# Patient Record
Sex: MALE | Race: WHITE | NOT HISPANIC OR LATINO | Employment: FULL TIME | URBAN - METROPOLITAN AREA
[De-identification: names, ages, dates, MRNs, and addresses within clinical notes are randomized per-mention and may not be internally consistent; named-entity substitution may affect disease eponyms.]

---

## 2020-02-21 ENCOUNTER — HOSPITAL ENCOUNTER (EMERGENCY)
Facility: OTHER | Age: 25
Discharge: HOME | End: 2020-02-21
Attending: EMERGENCY MEDICINE
Payer: COMMERCIAL

## 2020-02-21 VITALS
SYSTOLIC BLOOD PRESSURE: 109 MMHG | HEART RATE: 82 BPM | TEMPERATURE: 98 F | RESPIRATION RATE: 20 BRPM | OXYGEN SATURATION: 97 % | DIASTOLIC BLOOD PRESSURE: 78 MMHG

## 2020-02-21 DIAGNOSIS — S62.035A CLOSED NONDISPLACED FRACTURE OF PROXIMAL THIRD OF SCAPHOID BONE OF LEFT WRIST, INITIAL ENCOUNTER: Primary | ICD-10-CM

## 2020-02-21 DIAGNOSIS — S42.92XA FRACTURE, SHOULDER, LEFT, CLOSED, INITIAL ENCOUNTER: ICD-10-CM

## 2020-02-21 DIAGNOSIS — T14.90XA TRAUMA: ICD-10-CM

## 2020-02-21 DIAGNOSIS — S70.02XA CONTUSION OF LEFT HIP, INITIAL ENCOUNTER: ICD-10-CM

## 2020-02-21 PROCEDURE — 99284 EMERGENCY DEPT VISIT MOD MDM: CPT | Mod: 25

## 2020-02-21 PROCEDURE — 96374 THER/PROPH/DIAG INJ IV PUSH: CPT

## 2020-02-21 PROCEDURE — 25000003 PHARM REV CODE 250: Performed by: EMERGENCY MEDICINE

## 2020-02-21 PROCEDURE — 96372 THER/PROPH/DIAG INJ SC/IM: CPT | Mod: 59

## 2020-02-21 PROCEDURE — 29125 APPL SHORT ARM SPLINT STATIC: CPT

## 2020-02-21 PROCEDURE — 63600175 PHARM REV CODE 636 W HCPCS: Performed by: EMERGENCY MEDICINE

## 2020-02-21 PROCEDURE — 29130 APPL FINGER SPLINT STATIC: CPT | Mod: FA

## 2020-02-21 RX ORDER — OXYCODONE AND ACETAMINOPHEN 5; 325 MG/1; MG/1
1 TABLET ORAL
Status: COMPLETED | OUTPATIENT
Start: 2020-02-21 | End: 2020-02-21

## 2020-02-21 RX ORDER — KETOROLAC TROMETHAMINE 30 MG/ML
30 INJECTION, SOLUTION INTRAMUSCULAR; INTRAVENOUS
Status: COMPLETED | OUTPATIENT
Start: 2020-02-21 | End: 2020-02-21

## 2020-02-21 RX ORDER — IBUPROFEN 600 MG/1
600 TABLET ORAL EVERY 8 HOURS PRN
Qty: 9 TABLET | Refills: 0 | Status: SHIPPED | OUTPATIENT
Start: 2020-02-21

## 2020-02-21 RX ORDER — OXYCODONE AND ACETAMINOPHEN 5; 325 MG/1; MG/1
1 TABLET ORAL EVERY 6 HOURS PRN
Qty: 10 TABLET | Refills: 0 | Status: SHIPPED | OUTPATIENT
Start: 2020-02-21

## 2020-02-21 RX ADMIN — KETOROLAC TROMETHAMINE 30 MG: 30 INJECTION, SOLUTION INTRAMUSCULAR; INTRAVENOUS at 10:02

## 2020-02-21 RX ADMIN — OXYCODONE HYDROCHLORIDE AND ACETAMINOPHEN 1 TABLET: 5; 325 TABLET ORAL at 10:02
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
## 2025-06-09 NOTE — PROGRESS NOTES
Jace Ceja  1995        Subjective     Chief Complaint: Establish Care      History of Present Illness:  Mr. Jace Ceja is a 30 y.o. male who presents to clinic for establishing care/annual        History of Present Illness    CHIEF COMPLAINT:  Mr. Ceja presents today for chronic fatigue and concerns about high iron levels    HISTORY OF PRESENT ILLNESS:  He reports experiencing chronic fatigue over the past few weeks significant enough to impact his daily routine, causing him to miss several days at his usual daily gym routine. His diet has also been affected due to the fatigue, though he reports feeling better today. He experiences abdominal trembling, liver pain, and frequent urination occurring less than 30 minutes apart. He also notes mild shortness of breath with exertion, specifically when walking and climbing stairs.    MEDICAL HISTORY:  He has a history of elevated hemoglobin levels up to 18 on previous testing. He currently takes creatine HCL supplements regularly, denying excessive use.    MENTAL HEALTH HISTORY:  He has a history of depression with previous treatments including Lexapro, Wellbutrin, and Seroquel. He has undergone ketamine infusion therapy, completing four sessions in fall of previous year with most recent infusion 2 weeks ago. He has also received ECT treatment in the past and had a 5-week mental health rehabilitation stay in [place], [place] last year. He currently attends weekly counseling sessions. He reports a recent depressive episode triggered by viewing ex-girlfriend's social media.    SUBSTANCE USE HISTORY:  He has a history of alcohol abuse, previously consuming 8-10 beers nightly but reports 10 months of sobriety. He also reports past cocaine use for 2.5 years with almost one year of sobriety. He currently uses nicotine through vaping and expresses desire to quit.      ROS:  Constitutional: +fatigue  ENT: -difficulty swallowing  Respiratory: +exertional  dyspnea  Gastrointestinal: +abdominal pain  Genitourinary: -hematuria  Psychiatric: +depression, +suicidal ideation, +lack of interest  Endocrine: +excessive urination         PAST HISTORY:     Past Medical History:   Diagnosis Date    Depression     Iron overload        Past Surgical History:   Procedure Laterality Date    HERNIA REPAIR  10/6/2022    TONSILLECTOMY  2016       Family History   Problem Relation Name Age of Onset    Depression Mother Kirstin Landry     Cancer Father Russ Ceja     Alcohol abuse Maternal Grandfather Franklyn Ceja ()     Cancer Maternal Grandfather Franklyn Ceja ()     Alcohol abuse Maternal Grandmother Awilda Landry ()     Cancer Paternal Grandmother Laura Ceja     Depression Paternal Grandmother Laura Ceja     Mental illness Paternal Grandmother Laura Ceja     Alcohol abuse Maternal Uncle Kemal aLndry     Drug abuse Maternal Uncle Kemal Landry     Alcohol abuse Paternal Cousin Bishnu Hua ()     Depression Paternal Cousin Bishnu Hua ()     Drug abuse Paternal Cousin Bishnu Hua ()     Early death Paternal Cousin Bishnu Hua ()     Depression Maternal Aunt Ariel Castro     Mental illness Maternal Aunt Ariel Castro     Depression Brother Ervin Ceja        Social History     Socioeconomic History    Marital status: Single   Tobacco Use    Smoking status: Every Day     Types: Vaping with nicotine    Smokeless tobacco: Never   Substance and Sexual Activity    Alcohol use: Not Currently     Comment: Sober for 10 months    Drug use: Not Currently     Types: Cocaine, Ketamine, LSD, Marijuana, MDMA (Ecstacy), Psilocybin     Comment: Clean from all drugs for 10 months.  Ketamine infusions.    Sexual activity: Not Currently     Partners: Female     Birth control/protection: Coitus interruptus     Comment: Former partner was on birth control.     Social Drivers of Health  "    Financial Resource Strain: Medium Risk (6/10/2025)    Overall Financial Resource Strain (CARDIA)     Difficulty of Paying Living Expenses: Somewhat hard   Food Insecurity: No Food Insecurity (6/10/2025)    Hunger Vital Sign     Worried About Running Out of Food in the Last Year: Never true     Ran Out of Food in the Last Year: Never true   Transportation Needs: No Transportation Needs (6/10/2025)    PRAPARE - Transportation     Lack of Transportation (Medical): No     Lack of Transportation (Non-Medical): No   Physical Activity: Sufficiently Active (6/10/2025)    Exercise Vital Sign     Days of Exercise per Week: 7 days     Minutes of Exercise per Session: 100 min   Stress: Stress Concern Present (6/10/2025)    [employer] - Occupational Stress Questionnaire     Feeling of Stress : Rather much   Housing Stability: Low Risk  (6/10/2025)    Housing Stability Vital Sign     Unable to Pay for Housing in the Last Year: No     Number of Times Moved in the Last Year: 0     Homeless in the Last Year: No       MEDICATIONS & ALLERGIES:     Current Outpatient Medications on File Prior to Visit   Medication Sig    [DISCONTINUED] ibuprofen (ADVIL,MOTRIN) 600 MG tablet Take 1 tablet (600 mg total) by mouth every 8 (eight) hours as needed for Pain.    [DISCONTINUED] oxyCODONE-acetaminophen (PERCOCET) 5-325 mg per tablet Take 1 tablet by mouth every 6 (six) hours as needed for Pain.     No current facility-administered medications on file prior to visit.       Review of patient's allergies indicates:  No Known Allergies    OBJECTIVE:     Vital Signs:  Vitals:    06/10/25 1125   BP: 110/76   BP Location: Right arm   Patient Position: Sitting   Pulse: 64   Resp: 18   Temp: 97.9 °F (36.6 °C)   TempSrc: Temporal   SpO2: 98%   Weight: 77.7 kg (171 lb 4.8 oz)   Height: 5' 11" (1.803 m)       Body mass index is 23.89 kg/m².     Physical Exam:  Physical Exam  Vitals and nursing note reviewed. " "  Constitutional:       General: He is not in acute distress.     Appearance: He is not ill-appearing.   HENT:      Head: Normocephalic and atraumatic.      Mouth/Throat:      Mouth: Mucous membranes are moist.      Pharynx: Oropharynx is clear. No oropharyngeal exudate or posterior oropharyngeal erythema.   Eyes:      Extraocular Movements: Extraocular movements intact.      Conjunctiva/sclera: Conjunctivae normal.   Cardiovascular:      Rate and Rhythm: Normal rate and regular rhythm.   Pulmonary:      Effort: Pulmonary effort is normal. No respiratory distress.      Breath sounds: Normal breath sounds. No wheezing or rales.   Chest:      Chest wall: No tenderness.   Abdominal:      Palpations: Abdomen is soft.      Tenderness: There is no abdominal tenderness. There is no guarding.   Musculoskeletal:         General: Normal range of motion.      Cervical back: Normal range of motion and neck supple. No tenderness.      Right lower leg: No edema.      Left lower leg: No edema.   Lymphadenopathy:      Cervical: No cervical adenopathy.   Skin:     General: Skin is warm and dry.   Neurological:      Mental Status: He is alert and oriented to person, place, and time.   Psychiatric:         Attention and Perception: Attention normal.         Mood and Affect: Mood is depressed.         Speech: Speech normal.         Behavior: Behavior normal. Behavior is not aggressive or hyperactive. Behavior is cooperative.         Thought Content: Thought content includes suicidal ideation. Thought content does not include homicidal ideation. Thought content does not include homicidal or suicidal plan.            Laboratory  No results found for: "WBC", "HGB", "HCT", "MCV", "PLT"  No results found for: "GLU", "NA", "K", "CL", "CO2", "BUN", "CREATININE", "CALCIUM", "MG"  No results found for: "INR", "PROTIME"  No results found for: "HGBA1C"  No results for input(s): "POCTGLUCOSE" in the last 72 hours.      Health Maintenance         " "Date Due Completion Date    Hepatitis C Screening Never done ---    Lipid Panel Never done ---    HIV Screening Never done ---    TETANUS VACCINE Never done ---    Pneumococcal Vaccines (Age 0-49) (1 of 2 - PCV) Never done ---    COVID-19 Vaccine (3 - 2024-25 season) 09/01/2024 5/4/2021    Influenza Vaccine (Season Ended) 09/01/2025 10/28/2019    RSV Vaccine (Age 60+ and Pregnant patients) (1 - 1-dose 75+ series) 05/06/2072 ---            ASSESSMENT & PLAN:   30 y.o. male who was seen today in clinic for establishing care/annual    Annual physical exam  -     Hemoglobin A1C; Future; Expected date: 06/10/2025  -     CBC Without Differential; Future; Expected date: 06/10/2025  -     Comprehensive Metabolic Panel; Future; Expected date: 06/10/2025  -     TSH; Future; Expected date: 06/10/2025  -     Lipid Panel; Future; Expected date: 06/10/2025  -     Hepatitis C Antibody; Future; Expected date: 06/10/2025  -     HIV 1/2 Ag/Ab (4th Gen); Future; Expected date: 06/10/2025  -     Ferritin; Future; Expected date: 06/10/2025  -     Iron and TIBC; Future; Expected date: 06/10/2025  -     Urinalysis; Future    Erythrocytosis  -     Ferritin; Future; Expected date: 06/10/2025  -     Iron and TIBC; Future; Expected date: 06/10/2025    Moderate episode of recurrent major depressive disorder  -     Ambulatory referral/consult to Psychiatry; Future; Expected date: 06/17/2025    Nicotine use    Alcohol dependence in remission         1. Annual physical exam    2. Erythrocytosis    3. Moderate episode of recurrent major depressive disorder    4. Nicotine use    5. Alcohol dependence in remission        1/2.  Nonfasting labs ordered.  Prior labs with erythrocytosis noted.  Iron studies ordered.  Consider hematology referral.  3.  Recent worsening mood after "lapse of judgement" and looking at ex girlfriend's social media page.  Reports suicidal ideation however denies intent or plan.  Decrease in interest in hobbies.  No HI.  Hx of " ketamine infusions/ECTs in past.  Has attempted Seroquel, Lexapro, and wellbutrin and reports side effects.  Not interested in restarting meds with these concerns.  Follows with therapist regularly.  Psych referral ordered.  Strict RTC/ED precautions given.  4.  Discussed importance of cessation.  Cessation program referral offered, deferred today and will consider.  5.  Stable, encouraged patient to remain abstinent from alcohol use      RTC in 3-6 months or sooner if needed    Cipriano Loera MD  [facility]    This note was generated with the assistance of ambient listening technology. Verbal consent was obtained by the patient and accompanying visitor(s) for the recording of patient appointment to facilitate this note. I attest to having reviewed and edited the generated note for accuracy, though some syntax or spelling errors may persist. Please contact the author of this note for any clarification.

## 2025-06-10 ENCOUNTER — LAB VISIT (OUTPATIENT)
Dept: LAB | Facility: HOSPITAL | Age: 30
End: 2025-06-10
Payer: COMMERCIAL

## 2025-06-10 ENCOUNTER — OFFICE VISIT (OUTPATIENT)
Dept: INTERNAL MEDICINE | Facility: CLINIC | Age: 30
End: 2025-06-10
Payer: COMMERCIAL

## 2025-06-10 VITALS
HEART RATE: 64 BPM | SYSTOLIC BLOOD PRESSURE: 110 MMHG | TEMPERATURE: 98 F | BODY MASS INDEX: 23.98 KG/M2 | RESPIRATION RATE: 18 BRPM | HEIGHT: 71 IN | OXYGEN SATURATION: 98 % | DIASTOLIC BLOOD PRESSURE: 76 MMHG | WEIGHT: 171.31 LBS

## 2025-06-10 DIAGNOSIS — Z00.00 ANNUAL PHYSICAL EXAM: ICD-10-CM

## 2025-06-10 DIAGNOSIS — Z72.0 NICOTINE USE: ICD-10-CM

## 2025-06-10 DIAGNOSIS — F33.1 MODERATE EPISODE OF RECURRENT MAJOR DEPRESSIVE DISORDER: ICD-10-CM

## 2025-06-10 DIAGNOSIS — F10.21 ALCOHOL DEPENDENCE IN REMISSION: ICD-10-CM

## 2025-06-10 DIAGNOSIS — Z00.00 ANNUAL PHYSICAL EXAM: Primary | ICD-10-CM

## 2025-06-10 DIAGNOSIS — D75.1 ERYTHROCYTOSIS: ICD-10-CM

## 2025-06-10 PROBLEM — E83.19 IRON OVERLOAD: Status: RESOLVED | Noted: 2025-06-10 | Resolved: 2025-06-10

## 2025-06-10 PROBLEM — E83.19 IRON OVERLOAD: Status: ACTIVE | Noted: 2025-06-10

## 2025-06-10 PROBLEM — F33.0 MILD EPISODE OF RECURRENT MAJOR DEPRESSIVE DISORDER: Status: ACTIVE | Noted: 2025-06-10

## 2025-06-10 LAB
BILIRUB UR QL STRIP.AUTO: NEGATIVE
CLARITY UR: CLEAR
COLOR UR AUTO: COLORLESS
GLUCOSE UR QL STRIP: NEGATIVE
HGB UR QL STRIP: NEGATIVE
KETONES UR QL STRIP: NEGATIVE
LEUKOCYTE ESTERASE UR QL STRIP: NEGATIVE
NITRITE UR QL STRIP: NEGATIVE
PH UR STRIP: 6 [PH]
PROT UR QL STRIP: NEGATIVE
SP GR UR STRIP: 1.01
UROBILINOGEN UR STRIP-ACNC: NEGATIVE EU/DL

## 2025-06-10 PROCEDURE — 99385 PREV VISIT NEW AGE 18-39: CPT | Mod: S$GLB,,,

## 2025-06-10 PROCEDURE — 1159F MED LIST DOCD IN RCRD: CPT | Mod: CPTII,S$GLB,,

## 2025-06-10 PROCEDURE — 3074F SYST BP LT 130 MM HG: CPT | Mod: CPTII,S$GLB,,

## 2025-06-10 PROCEDURE — 3008F BODY MASS INDEX DOCD: CPT | Mod: CPTII,S$GLB,,

## 2025-06-10 PROCEDURE — 81003 URINALYSIS AUTO W/O SCOPE: CPT

## 2025-06-10 PROCEDURE — 1160F RVW MEDS BY RX/DR IN RCRD: CPT | Mod: CPTII,S$GLB,,

## 2025-06-10 PROCEDURE — 3078F DIAST BP <80 MM HG: CPT | Mod: CPTII,S$GLB,,

## 2025-06-10 PROCEDURE — 99999 PR PBB SHADOW E&M-NEW PATIENT-LVL IV: CPT | Mod: PBBFAC,,,

## 2025-06-11 ENCOUNTER — PATIENT MESSAGE (OUTPATIENT)
Dept: HEMATOLOGY/ONCOLOGY | Facility: CLINIC | Age: 30
End: 2025-06-11
Payer: COMMERCIAL

## 2025-06-11 ENCOUNTER — RESULTS FOLLOW-UP (OUTPATIENT)
Dept: INTERNAL MEDICINE | Facility: CLINIC | Age: 30
End: 2025-06-11

## 2025-06-12 ENCOUNTER — PATIENT MESSAGE (OUTPATIENT)
Dept: ADMINISTRATIVE | Facility: OTHER | Age: 30
End: 2025-06-12
Payer: COMMERCIAL

## 2025-06-18 ENCOUNTER — PATIENT MESSAGE (OUTPATIENT)
Dept: ADMINISTRATIVE | Facility: OTHER | Age: 30
End: 2025-06-18
Payer: COMMERCIAL

## 2025-07-02 ENCOUNTER — OFFICE VISIT (OUTPATIENT)
Dept: PSYCHIATRY | Facility: CLINIC | Age: 30
End: 2025-07-02
Payer: COMMERCIAL

## 2025-07-02 VITALS
DIASTOLIC BLOOD PRESSURE: 77 MMHG | BODY MASS INDEX: 25 KG/M2 | WEIGHT: 179.25 LBS | SYSTOLIC BLOOD PRESSURE: 113 MMHG | HEART RATE: 69 BPM

## 2025-07-02 DIAGNOSIS — F32.A DEPRESSION, UNSPECIFIED DEPRESSION TYPE: Primary | ICD-10-CM

## 2025-07-02 DIAGNOSIS — F41.1 GENERALIZED ANXIETY DISORDER: ICD-10-CM

## 2025-07-02 DIAGNOSIS — F10.91 ALCOHOL USE DISORDER IN REMISSION: ICD-10-CM

## 2025-07-02 DIAGNOSIS — F33.1 MODERATE EPISODE OF RECURRENT MAJOR DEPRESSIVE DISORDER: ICD-10-CM

## 2025-07-02 DIAGNOSIS — F19.90 POLYSUBSTANCE USE DISORDER: ICD-10-CM

## 2025-07-02 PROCEDURE — 99999 PR PBB SHADOW E&M-EST. PATIENT-LVL III: CPT | Mod: PBBFAC,,,

## 2025-07-02 NOTE — PROGRESS NOTES
"Outpatient Psychiatry Clinic Initial Evaluation  [facility]  2025  Patient Name: Jace Ceja   : 1995     Source of information: Patient    Chief Complaint (CC):     Establish care for "Moderate episode of recurrent major depressive disorder" per PCP Dr. Loera on 6/10/2025. Per note, patient has a history of cocaine use disorder and alcohol use disorder in remission.     History of Present Illness (HPI):     Patient seen today. Reports feeling "good". Discussed reason for referral being depression. Reports he has "had clinical depression my whole life". Last month felt worse, says he "spent all year working hard to save it off".   Currently endorses feeling "not as good as when I was in the mental health rehab". Used to be a "big gym matthew" - this stopped being as enjoyable. Also feels chronic fatigue. However admits he went to bed late lately due to "talking to a girl". But feels "very fatigued multiple days out of the week". When depressed, not eating. Now eating well. Denies any depressed mood currently, but describes mood as "not terrible, not 100%", rates at a 6/10. Had SI recently, not today. This had been resolved "for a while", but now "not as terrible". SI is usually passive, "more wishful thinking".     Endorses substance use issues for most of adult life. Has been sober from all substances for 11 months. Started with "party drugs" in college, became problematic when ex-fiance ended five year relationship. Reports he then became addicted to cocaine - sober from cocaine for about a year. Already had drinking problems before that, drank as much as 8-10 beers daily.     First anxiety attack, or "anxiety-filled day" when was 7 yo. Endorses depression and anxiety throughout his life - starting as a child, constantly worried "something terrible will happen", "did something wrong", "may be yelled at". Was "terrified" of mother growing up. Had some corporal punishment. Denies " "physical abuse.  Never wanted to take meds as a teenager despite encouragement from parents. Thought it would change him. Now feels he "was right". Endorses being "traumatized of medications". States "medications are reason he's here right now".   Resistant to the idea of medications now. Started taking medications on psychiatric admission in 2020 - initially noted some improvement in symptoms of anxiety and depression, but has used "other routes" to get better and although now "on a downturn", feels he is better off of meds. Pleads this provider to not enforce medications on him. Eventually wants to get a full psychological evaluation. Believes he has "issues beyond MILIND and depression". Believes he was also previously misdiagnosed with Bipolar disorder. Has looked into borderline personality disorder, feels he has some ASD, and that his social anxiety is worse than his generalized anxiety. People his own age make him nervous, connects with middle-aged people most of the time. Has anxiety about "certain scenarios" such as being late. Also gets anxious and panics when he gets "yelled at" at work by customers. Relates to BPD in that he has "bad attachment, unhealthy relationships, fear of abandonment, substance use issues". Sees counselor.therapist every week who also recommended he gets a formal evaluation. Does CBT/supportive therapy with them.     First psychiatric hospitalization was a voluntary admission in 2020: had started new job (first IT job), had someone yell at him at work, had panic atttacks, grandfather and best friend had passed away, had conflict with his girlfriend at the time. Was started on seroquel, wellbutrin, Lexapro at [facility] in [facility]. Was admitted for one week.     Second hospitalization was in 2020. Patient is very unhappy with this, this was an involuntary admission. Confided in his therapist/psychiatrist, told them he "went out looking for drugs 'hoping they'd be " "bad' or laced with fentanyl", "then ended up using more cocaine". When inquired about his thoughts at the time when he informed provider of his SI, he describes it as having "passive death wish", not actively trying to kill himself. Was admitted to [facility] in [place] for one week. Believes he was maintained on Seroquel, Wellbutrin, and Lexapro.     Stopped Seroquel, Lexapro, and Wellbutrin then.  Was taking Seroquel for sleep. While on this medication, along with Lexapro and Wellbutrin, he would "binge 8-10 beers everyday". Reports medication combination made him gain weight, made him crave substances, and made him "not care about anything", "another reason why my ex fiance wanted to leave".   Reports that when he stopped Seroquel in April 2024, cravings for alcohol and drugs stopped. Had to stop meds due to employer and insurances changes. States "it was easier to get off of cocaine than Seroquel". Overall believes the reason he is seeking help today is because of the alleged negative effects psychotropics had on him. Recalls being on Prozac in remote past - notes he did not feel any benefit or side effects.     Last year visited mother in [facility] where he is from. Since then has not talked to mom. Around that time was severely depressed, had confronted mother and blamed her for "aspects of my life". Grandmother encouraged him to get admitted into a hospital in [place]. Was told that cousin with severe substance use issues was "happier than [him]", ended up dying 4 months later. Substance use history is significant in the family.  At this [place] "mental health rehab", he underwent 3 Ketamine infusion treatment and ECT treatment on 4-6 occasions. Reports staff would not allow ketamine treatment due to documented history of bipolar disorder. Then they concluded that his symptoms suggestive of bipolar disorder overlapped with period of regular polysubstance use. Took a genetic test in rehab to find out " "what meds might work. Believes it concluded he was susceptible to adverse effects of Seroquel. Was there for a total 5 weeks. Left in September.   Overall recalls "Ketamine saved my life the first time": stopped having SI, "could function correctly", this combined with ECT.     Since returning back to [place], he's undergone two more Ketamine infusions at infusion center in [place]. Did one in October 2024, didn't feel need for one until May of this year after he "made a mistake by checking ex-girlfriend's Instagram".   May continue treatments. But note sure as he noted lack of benefit on last one, also expensive.     Didn't start processing breakup until close to sobriety, after program did a "self assessment". Has been single for 3.5 years.   Endorses obsessions about ex-girlfriend who is now in new relationship, this bothers him. Tends to distract himself self to cope. Denies any risky or threatening behavior surrounding this situation.     Has been seeing therapist for 10 months - started therapy with them originally back in 2020, then had some disagreements.     Denies any symptoms suggestive of fidelia or hypomania - had some behavior similar to that during short periods when under the influence. Denies any PTSD, but states more of his traumas are "from people". Endorses panic attacks when clients/colleagues are angry or "yell at [him]". Denies any history of hallucinations. Has had paranoia before in the form of "imagining a scenario and believing it will go that way".       Current medication regimen:   None     Past Medication Trials:  Seroquel, Lexapro, Wellbutrin, Prozac, Trintellix         Psychiatric Review of Systems (ROS):     Depression: Fatigue, low energy, low motivation, anhedonia, depressed mood (a little bit), intermittent passive SI      Fidelia/hypomania: denies when clinically sober      Anxiety (MILIND and PA): mostly social anxiety, other situational anxiety      Psychosis: intermittent " "paranoia, no hallucinations      PTSD: on sera segura during terrorist attack - some avoidance (also doesn't drink anymore)      Eating Disorders:  denies      OCD: obsessions, no compulsive behaviors      ADHD: inattention/distractibility at work - especially when working from home, worse when tired     Sleep: well, but feels quality of sleep not the best     Appetite: good       Past Medical, Psychiatric, Family, and Social History:       Past Medical/Surgical History:  Past Medical History:   Diagnosis Date    Depression     Iron overload      Past Surgical History:   Procedure Laterality Date    HERNIA REPAIR  10/6/2022    TONSILLECTOMY  5/25/2016       Past Neurological History:  Seizures:  no    Head trauma: had a scalp laceration, no concussion     Past Psychiatric History:  Previous Medication Trials: Seroquel (weight gain, alcohol/drug cravings, lack of interest), Lexapro (unknown SE, taken in combination with Seroquel), Wellbutrin (unknown SE, taken in combination with Seroquel), Prozac (no benefit or side effect), Trintellix per chart   Previous Psychiatric Hospitalizations: for SI, in 2020 and 2022. One "mental health rehab" admission in 2024   Previous Suicide Attempts: never, no h/o self harm, but endorses substance use as self-harming behavior    History of Violence: no   Outpatient Psychiatrist: previously with [facility] - didn't like short sessions, telehealth, being maintained on meds that were giving him side effects    Outpatient Therapist: Linda Silverio at "[facility]"   Family History: substance use the family - brother and mother with severe depression, dad some depression, maternal uncle with BIENVENIDO, some alcohol abuse in parents, maternal aunt with AUD, paternal grandfather AUD, paternal great uncle with AUD/cocaine abuse.     Social History:  Marital Status: single  Children: 0   Employment Status/Info: Employed at [employer] company   Education: bachelor's degree in " "information technology   Special Ed: no   Housing Status: lives alone w/ cat   History of phys/sexual abuse: no, only corporal punishment from mother   Access to gun: no     Substance Abuse History:  Recreational Drugs: sober for 11 months - alcohol, cocaine, weed, occasional MDMA/psychedelics/"street ketamine"   Use of Alcohol: sober for 1 year, used to drink 8-10 beers/night   Tobacco Use: vape daily, attempting to quit   Use of OTC: alieve, tylenol   Rehab History: Never - was told by parents he'd lose his job if enrolled in rehab   AA/NA Meetings:  no   Periods of Sobriety: 11 months   Withdrawal: none     Legal History:  Past Charges/Incarcerations: none   Pending charges: none     Allergies:  Patient has no known allergies.      Risk Parameters:  Patient reports no suicidal ideation  Patient reports no homicidal ideation  Patient reports no self-injurious behavior  Patient reports no violent behavior      MENTAL STATUS EXAMINATION  General Appearance: well-developed, well-nourished, normal weight, dressed in casual attire, in no acute distress  Behavior: normal, cooperative, appropriate eye-contact, under good behavioral control  Involuntary Movements and Motor Activity: no abnormal involuntary movements noted; no tics, no tremors, no akathisia, no dystonia, no evidence of tardive dyskinesia; no psychomotor agitation or retardation  Muscle Strength and Tone: intact  Gait and Station: intact, normal gait and station, ambulates without assistance  Speech: intact; normal rate, rhythm, volume, tone and pitch; conversational, spontaneous, and coherent  Language: intact; speaks and understands English fluently and proficiently; repeats words and phrases, no word finding difficulties are noted  Mood: "not terrible, not 100%" 6/10   Affect: reactive, non irritable  Thought Process: intact, linear, goal-directed, organized, logical  Associations: intact, no loosening of associations  Thought Content and Perceptions: no " suicidal or homicidal ideation, no auditory or visual hallucinations, no paranoid ideation, no ideas of reference, no evidence of delusions or psychosis  Sensorium/Arousal: alert with clear sensorium  Orientation: intact; oriented fully to person, place, time and situation  Recent and Remote Memory: grossly intact, able to recall relevant and salient information from the recent and remote past  Attention and Concentration: grossly intact, attentive to the conversation and not readily distractible  Fund of Knowledge: grossly intact, used appropriate vocabulary and demonstrated an awareness of current events  Insight: adequate  Judgment: behavior is adequate/appropriate to circumstances      Medical Decision Making - Assessment:     DIAGNOSIS:    ICD-10-CM ICD-9-CM   1. Depression, unspecified depression type  F32.A 311   2. Generalized anxiety disorder  F41.1 300.02   3. Moderate episode of recurrent major depressive disorder  F33.1 296.32   4. Alcohol use disorder in remission  F10.91 305.03   5. Polysubstance use disorder  F19.90 305.90       General Impression:  31 yo Male with a past psychiatric history of depression, anxiety, polysubstance use disorder (alcohol, stimulant, cannabis, hallucinogenic) who presents to the clinic for an initial evaluation on referral from PCP for major depressive disorder. This referral was made by provider when patient established care with PCP on 6/10 and reported history recurrent depression, SI and chronic fatigue. Patient since last episode of depression in May/June underwent a ketamine infusion treatment which was not very beneficial. Since last inpatient psychiatric care in 2024, patient has been resistant to psychotropics for his depression and anxiety which he believes caused substance use at the time. Has been sober from all substances for ~11 months. Today, patient denies SI/HI/AVH but would like to feel better. No objective concerns for joanne/hypomania or psychosis per  objective findings and subjective report. He is not interested in psychiatric care at this time, desires to look into other methods. Ultimately requests to undergo a formal psychological evaluation - believes his mental health issues stem from a personality disorder, specifically borderline personality disorder, and/or autism (endorses significant social anxiety). Discussed history report consistent with cluster B personality traits, and benefit of enrolling in dialectical behavioral therapy. He is enrolled in weekly therapy which he believes is CBT/supportive. Psychological testing referral ordered. No additional concerns were raised.       Strengths and liabilities:  Strength: Patient is expressive/articulate., Strength: Patient is intelligent., Strength: Patient is motivated for change., Strength: Patient is physically healthy., Liability: Patient has no suport network., Liability: Patient lacks coping skills.  Protective factors: help-seeking/motivated for change, intact reality testing, no SI, no recent attempts, no access to firearms, and age 26-57 y/o  Risk factors: , male, single, poor social support, substance use disorder, and intermittent passive SI (no present today)        Medical Decision Making - Plan:     Patient not interested in medications at this time. Patient aware he can return to clinic should he be interested in medication management.   Psychology Diagnostic Testing Referral ordered   PMDP reviewed: no prior controlled substances prescribed   Lifestyle modifications to reduce symptoms non-pharmacologically (ex: sleep, exercise, diet, etc).  Established with psychotherapy - Recommended DBT   Patient advised to continue to refrain from substance use as drugs/alcohol can exacerbate psychiatric symptoms.  Briefly counseled  on sleep hygiene    RTC as needed or sooner if needed    Discussed with patient verbal informed consent including: risks and benefits of proposed treatment above vs.  alternative treatments vs. no treatment, serious and common side effects of these respective treatments, as well as the inherent unpredictability of individual responses to any treatments, contingency plans if adverse reactions occur, precautions in which to me through Epic MyChart portal or call the clinic, and precautions in which to call 911 and/or go to the emergency room. The patient expresses understanding of the above and displays the capacity to agree with this current plan. All questions were answered.    Case discussed & seen by staff psychiatrist: Dr. Naun Ansari.    Total of 74 minutes spent today on encounter, including chart review,  review, seeing patient, documenting encounter, ordering medications.      Billing Documentation:     Method of Encounter IN PERSON visit at the clinic   Type of Encounter New patient to me, NOT seen by department within last 3 years   Counseling;  Psychotherapy Not applicable: Not done or UNDER 16 minutes   Counseling;  Tobacco and/or Nicotine Not applicable: Not done or UNDER 3 minutes   Time Remaining Chart/Pt 34399: NEW patient to me, NOT seen by department within last 3 years, 74 minutes   Total Mins  (7/2/2025) TIME: I spent a total of 74 minutes on the day of the visit. This includes face to face time and non-face to face time preparing to see the patient (eg, review of tests), obtaining and/or reviewing separately obtained history, documenting clinical information in the electronic or other health record, independently interpreting results and communicating results to the patient/family/caregiver, or care coordinator.       Brandt Zamora MD  [facility], PGY-III  [facility]